# Patient Record
Sex: MALE | Race: WHITE | NOT HISPANIC OR LATINO | Employment: FULL TIME | URBAN - METROPOLITAN AREA
[De-identification: names, ages, dates, MRNs, and addresses within clinical notes are randomized per-mention and may not be internally consistent; named-entity substitution may affect disease eponyms.]

---

## 2019-02-20 ENCOUNTER — OFFICE VISIT (OUTPATIENT)
Dept: URGENT CARE | Facility: PHYSICIAN GROUP | Age: 29
End: 2019-02-20
Payer: COMMERCIAL

## 2019-02-20 ENCOUNTER — HOSPITAL ENCOUNTER (OUTPATIENT)
Facility: MEDICAL CENTER | Age: 29
End: 2019-02-20
Attending: PHYSICIAN ASSISTANT
Payer: COMMERCIAL

## 2019-02-20 VITALS
RESPIRATION RATE: 13 BRPM | OXYGEN SATURATION: 98 % | HEART RATE: 60 BPM | SYSTOLIC BLOOD PRESSURE: 138 MMHG | HEIGHT: 72 IN | BODY MASS INDEX: 28.31 KG/M2 | DIASTOLIC BLOOD PRESSURE: 90 MMHG | TEMPERATURE: 97.5 F | WEIGHT: 209 LBS

## 2019-02-20 DIAGNOSIS — Z20.2 EXPOSURE TO CHLAMYDIA: ICD-10-CM

## 2019-02-20 PROCEDURE — 99204 OFFICE O/P NEW MOD 45 MIN: CPT | Performed by: PHYSICIAN ASSISTANT

## 2019-02-20 PROCEDURE — 87491 CHLMYD TRACH DNA AMP PROBE: CPT

## 2019-02-20 PROCEDURE — 87591 N.GONORRHOEAE DNA AMP PROB: CPT

## 2019-02-20 RX ORDER — AZITHROMYCIN 500 MG/1
1000 TABLET, FILM COATED ORAL ONCE
Status: DISCONTINUED | OUTPATIENT
Start: 2019-02-20 | End: 2019-02-20

## 2019-02-20 RX ORDER — AZITHROMYCIN 500 MG/1
1000 TABLET, FILM COATED ORAL ONCE
Qty: 2 TAB | Refills: 0 | Status: SHIPPED | OUTPATIENT
Start: 2019-02-20 | End: 2019-02-20

## 2019-02-20 ASSESSMENT — ENCOUNTER SYMPTOMS: FEVER: 0

## 2019-02-21 DIAGNOSIS — Z20.2 EXPOSURE TO CHLAMYDIA: ICD-10-CM

## 2019-02-21 LAB
C TRACH DNA SPEC QL NAA+PROBE: NEGATIVE
N GONORRHOEA DNA SPEC QL NAA+PROBE: NEGATIVE
SPECIMEN SOURCE: NORMAL

## 2019-02-21 NOTE — PROGRESS NOTES
Subjective:   Neo Vann is a 28 y.o. male who presents today with   Chief Complaint   Patient presents with   • Sexually Transmitted Diseases     exposed to chlamydia       Sexually Transmitted Diseases   This is a new problem. The current episode started 1 to 4 weeks ago. The problem has been unchanged. Pertinent negatives include no fever or rash.   Patient states he received a call from his recent sexual partners that said she tested positive for Chlamydia.  He states that this encounter was likely in late January. He has had no discharge.  He does state that he has had more urinary frequency.    PMH:  has no past medical history on file.  MEDS: No current outpatient prescriptions on file.  ALLERGIES: No Known Allergies  SURGHX: History reviewed. No pertinent surgical history.  SOCHX:  reports that he has been smoking.  He has never used smokeless tobacco.  FH: Reviewed with patient, not pertinent to this visit.       Review of Systems   Constitutional: Negative for fever.   Genitourinary: Positive for frequency. Negative for dysuria and hematuria.   Skin: Negative for rash.   All other systems reviewed and are negative.       Objective:   /90   Pulse 60   Temp 36.4 °C (97.5 °F)   Resp 13   Ht 1.829 m (6')   Wt 94.8 kg (209 lb)   SpO2 98%   BMI 28.35 kg/m²   Physical Exam   Constitutional: Vital signs are normal. He appears well-developed and well-nourished. No distress.   HENT:   Head: Normocephalic and atraumatic.   Eyes: Pupils are equal, round, and reactive to light.   Cardiovascular: Normal rate, regular rhythm and normal heart sounds.    Pulmonary/Chest: Effort normal.   Genitourinary:   Genitourinary Comments: Patient deferred, no rash or discharge noted by patient   Musculoskeletal:   ROM in upper and lower extremities normal   Neurological: He is alert. Coordination normal.   Skin: Skin is warm and dry.   Psychiatric: He has a normal mood and affect.   Nursing note and vitals  reviewed.        Assessment/Plan:   Assessment    1. Exposure to chlamydia  - CHLAMYDIA/GC PCR URINE OR SWAB; Future  - cefTRIAXone (ROCEPHIN) 250 mg, lidocaine (XYLOCAINE) 1 % 0.9 mL for IM use; 250 mg by Intramuscular route Once.  - azithromycin (ZITHROMAX) 500 MG tablet; Take 2 Tabs by mouth Once for 1 dose.  Dispense: 2 Tab; Refill: 0  Patient given Zithromax for Chlamydia coverage. Patient also treated empirically for Gonorrhea with Rocephin.  Patient encouraged to follow up if he becomes symptomatic.  Differential diagnosis, natural history, supportive care, and indications for immediate follow-up discussed.      Patient agreeable to plan.     Patient was called on 2/24/19 and notified that Gonorrhea and Chlamydia urine testing came back Negative. Patient understanding and had no further questions.    Nathaniel Villa PA-C

## 2019-07-01 ENCOUNTER — OFFICE VISIT (OUTPATIENT)
Dept: URGENT CARE | Facility: PHYSICIAN GROUP | Age: 29
End: 2019-07-01
Payer: COMMERCIAL

## 2019-07-01 ENCOUNTER — HOSPITAL ENCOUNTER (OUTPATIENT)
Dept: RADIOLOGY | Facility: MEDICAL CENTER | Age: 29
End: 2019-07-01
Attending: FAMILY MEDICINE
Payer: COMMERCIAL

## 2019-07-01 VITALS
HEART RATE: 90 BPM | TEMPERATURE: 97.9 F | SYSTOLIC BLOOD PRESSURE: 128 MMHG | RESPIRATION RATE: 16 BRPM | OXYGEN SATURATION: 94 % | DIASTOLIC BLOOD PRESSURE: 90 MMHG | WEIGHT: 215 LBS | BODY MASS INDEX: 29.12 KG/M2 | HEIGHT: 72 IN

## 2019-07-01 DIAGNOSIS — M54.2 NECK DISCOMFORT: ICD-10-CM

## 2019-07-01 DIAGNOSIS — R06.02 SOB (SHORTNESS OF BREATH): ICD-10-CM

## 2019-07-01 DIAGNOSIS — M54.9 DORSALGIA: ICD-10-CM

## 2019-07-01 PROCEDURE — 99214 OFFICE O/P EST MOD 30 MIN: CPT | Performed by: FAMILY MEDICINE

## 2019-07-01 PROCEDURE — 71046 X-RAY EXAM CHEST 2 VIEWS: CPT

## 2019-07-01 ASSESSMENT — ENCOUNTER SYMPTOMS
SHORTNESS OF BREATH: 1
BACK PAIN: 1

## 2019-07-01 NOTE — PROGRESS NOTES
Subjective:      Neo Vann is a 28 y.o. male who presents with Shortness of Breath (x today, trouble swollowing, L side bakc pain radiates to chest)      - This is a pleasant and non toxic appearing 28 y.o. male with c/o ~1hr ago whilst in shower felt some sharp pain to upper Lt side chest and Lt anterior side of neck and radiates to his upper back. Sharp in nature, lasts a cpl seconds and feels getting worse. No fevers    Associated w/ dizziness and SOB. Was having sex a few minutes prior to his event.     Has Hx of grandfather dying from heart attack at age 20.     Drove to [place] about 2-3 wks ago, says it was about a 13hr car ride.               ALLERGIES:  Patient has no known allergies.     PMH:  History reviewed. No pertinent past medical history.     PSH:  History reviewed. No pertinent surgical history.    MEDS:  No current outpatient prescriptions on file.    ** I have documented what I find to be significant in regards to past medical, social, family and surgical history  in my HPI or under PMH/PSH/FH review section, otherwise it is contributory **           HPI    Review of Systems   Respiratory: Positive for shortness of breath.    Musculoskeletal: Positive for back pain.   All other systems reviewed and are negative.         Objective:     /90   Pulse 90   Temp 36.6 °C (97.9 °F) (Temporal)   Resp 16   Ht 1.829 m (6')   Wt 97.5 kg (215 lb)   SpO2 94%   BMI 29.16 kg/m²      Physical Exam   Constitutional: He appears well-developed. No distress.   HENT:   Head: Normocephalic and atraumatic.   Mouth/Throat: Oropharynx is clear and moist.   Eyes: Conjunctivae are normal.   Neck: Neck supple.   Cardiovascular: Regular rhythm.    No murmur heard.  Pulmonary/Chest: Effort normal. No respiratory distress.   Neurological: He is alert. He exhibits normal muscle tone.   Skin: Skin is warm and dry.   Psychiatric: He has a normal mood and affect. Judgment normal.   Nursing note and vitals  reviewed.              Assessment/Plan:         1. Dorsalgia  EKG - Clinic Performed    DX-CHEST-2 VIEWS   2. SOB (shortness of breath)  EKG - Clinic Performed    DX-CHEST-2 VIEWS   3. Neck discomfort  EKG - Clinic Performed    DX-CHEST-2 VIEWS           I asked patient to go to [facility] ER today for further eval.

## 2020-11-04 ENCOUNTER — NON-PROVIDER VISIT (OUTPATIENT)
Dept: URGENT CARE | Facility: PHYSICIAN GROUP | Age: 30
End: 2020-11-04

## 2020-11-04 PROCEDURE — 8907 PR URINE COLLECT ONLY: Performed by: PHYSICIAN ASSISTANT
